# Patient Record
Sex: FEMALE | Race: WHITE
[De-identification: names, ages, dates, MRNs, and addresses within clinical notes are randomized per-mention and may not be internally consistent; named-entity substitution may affect disease eponyms.]

---

## 2021-03-10 ENCOUNTER — HOSPITAL ENCOUNTER (EMERGENCY)
Dept: HOSPITAL 7 - FB.ED | Age: 21
Discharge: HOME | End: 2021-03-10
Payer: COMMERCIAL

## 2021-03-10 DIAGNOSIS — Z88.8: ICD-10-CM

## 2021-03-10 DIAGNOSIS — G43.909: Primary | ICD-10-CM

## 2021-03-10 PROCEDURE — 96374 THER/PROPH/DIAG INJ IV PUSH: CPT

## 2021-03-10 PROCEDURE — 85025 COMPLETE CBC W/AUTO DIFF WBC: CPT

## 2021-03-10 PROCEDURE — 36415 COLL VENOUS BLD VENIPUNCTURE: CPT

## 2021-03-10 PROCEDURE — 99284 EMERGENCY DEPT VISIT MOD MDM: CPT

## 2021-03-10 PROCEDURE — 80048 BASIC METABOLIC PNL TOTAL CA: CPT

## 2021-03-10 PROCEDURE — 84702 CHORIONIC GONADOTROPIN TEST: CPT

## 2021-03-10 PROCEDURE — 96375 TX/PRO/DX INJ NEW DRUG ADDON: CPT

## 2021-03-10 PROCEDURE — 96372 THER/PROPH/DIAG INJ SC/IM: CPT

## 2021-03-10 NOTE — EDM.PDOC
ED HPI GENERAL MEDICAL PROBLEM





- General


Chief Complaint: Neuro Symptoms/Deficits


Stated Complaint: HEAD HURTS,VOMITING


Time Seen by Provider: 03/10/21 12:35


Source of Information: Reports: Patient, Family


History Limitations: Reports: No Limitations





- History of Present Illness


INITIAL COMMENTS - FREE TEXT/NARRATIVE: 





Patient presented to the ED because of headache,nausea, and vomiting. The 

headache is over the frontal area,throbbing,8/10, with associated photophobia. 

Her headache got worse when she used the computer for her midterms.





- Related Data


                                    Allergies











Allergy/AdvReac Type Severity Reaction Status Date / Time


 


.allergy med Allergy  Cannot Uncoded 03/10/21 12:33





   Remember  











Home Meds: 


                                    Home Meds





Ondansetron [Zofran ODT] 4 mg PO Q4H PRN #5 tab.dis 03/10/21 [Rx]











Social & Family History





- Tobacco Use


Tobacco Use Status *Q: Never Tobacco User





- Caffeine Use


Caffeine Use: Reports: None





- Recreational Drug Use


Recreational Drug Use: No





ED ROS GENERAL





- Review of Systems


Review Of Systems: See Below


Constitutional: Reports: No Symptoms


HEENT: Reports: No Symptoms


Respiratory: Reports: No Symptoms


Cardiovascular: Reports: No Symptoms


Endocrine: Reports: No Symptoms


GI/Abdominal: Reports: Nausea, Vomiting


Musculoskeletal: Reports: No Symptoms


Skin: Reports: No Symptoms


Neurological: Reports: Headache


Psychiatric: Reports: No Symptoms





ED EXAM, GENERAL





- Physical Exam


Exam: See Below


Exam Limited By: No Limitations


General Appearance: Alert, No Apparent Distress


Ears: Normal External Exam, Normal Canal


Nose: Normal Inspection, Normal Mucosa, No Blood


Throat/Mouth: Normal Inspection, Normal Lips


Head: Atraumatic, Normocephalic


Neck: Normal Inspection, Supple, Non-Tender, Full Range of Motion


Respiratory/Chest: No Respiratory Distress, Lungs Clear, Normal Breath Sounds, 

No Accessory Muscle Use, Chest Non-Tender


Cardiovascular: Normal Peripheral Pulses, Regular Rate, Rhythm, No Edema, No 

Gallop, No JVD, No Murmur, No Rub


GI/Abdominal: Normal Bowel Sounds, Soft, Non-Tender, No Organomegaly, No 

Distention, No Abnormal Bruit, No Mass


Back Exam: Normal Inspection, Full Range of Motion


Extremities: Normal Inspection, Normal Range of Motion, Non-Tender, No Pedal 

Edema, Normal Capillary Refill


Neurological: Alert, Oriented, CN II-XII Intact, Normal Cognition, Normal Gait, 

Normal Reflexes, No Motor/Sensory Deficits


Psychiatric: Normal Affect


Skin Exam: Warm





Course





- Vital Signs


Text/Narrative:: 





Lab result was discussed with patient


NS 1 L bolus


Zofran 4 mg IV x1


Toradol 30 mg IV x1


Last Recorded V/S: 





                                Last Vital Signs











Temp  36.6 C   03/10/21 12:27


 


Pulse  79   03/10/21 12:27


 


Resp  16   03/10/21 12:27


 


BP  119/76   03/10/21 12:27


 


Pulse Ox  98   03/10/21 12:27














- Orders/Labs/Meds


Orders: 





                               Active Orders 24 hr











 Category Date Time Status


 


 BASIC METABOLIC PANEL,BMP [CHEM] Stat Lab  03/10/21 12:43 Ordered


 


 CBC WITH AUTO DIFF [HEME] Stat Lab  03/10/21 12:43 Ordered


 


 Sodium Chloride 0.9% [Normal Saline] 1,000 ml Med  03/10/21 13:00 Active





 IV ASDIRECTED   








                                Medication Orders





Sodium Chloride (Normal Saline)  1,000 mls @ 999 mls/hr IV ASDIRECTED SHANTELL


   Last Admin: 03/10/21 13:00  Dose: 999 mls/hr


   Documented by: MELIA








Meds: 





Medications











Generic Name Dose Route Start Last Admin





  Trade Name Freq  PRN Reason Stop Dose Admin


 


Sodium Chloride  1,000 mls @ 999 mls/hr  03/10/21 13:00  03/10/21 13:00





  Normal Saline  IV   999 mls/hr





  ASDIRECTED SHANTELL   Administration














Discontinued Medications














Generic Name Dose Route Start Last Admin





  Trade Name Freq  PRN Reason Stop Dose Admin


 


Ketorolac Tromethamine  30 mg  03/10/21 12:41  03/10/21 12:46





  Ketorolac 30 Mg/Ml Sdv  IVPUSH  03/10/21 12:42  30 mg





  NOW STA   Administration


 


Ondansetron HCl  4 mg  03/10/21 12:41  03/10/21 12:46





  Ondansetron 4 Mg/2 Ml Sdv  IVPUSH  03/10/21 12:42  4 mg





  NOW STA   Administration














Departure





- Departure


Time of Disposition: 13:45


Disposition: Home, Self-Care 01


Condition: Good


Clinical Impression: 


 Migraine








- Discharge Information


Prescriptions: 


Ondansetron [Zofran ODT] 4 mg PO Q4H PRN #5 tab.dis


 PRN Reason: Nausea


Instructions:  Migraine Headache


Additional Instructions: 


Please read discharge instructions on Migraine


Take zofran ODT 4 mg every 4 hours as needed for nausea


Ibuprofen 800 mg with tylenol 1000 mg every 8 hours as needed for headache


Follow up as needed





Sepsis Event Note (ED)





- Evaluation


Sepsis Screening Result: No Definite Risk





- Focused Exam


Vital Signs: 





                                   Vital Signs











  Temp Pulse Resp BP Pulse Ox


 


 03/10/21 12:27  36.6 C  79  16  119/76  98














- My Orders


Last 24 Hours: 





My Active Orders





03/10/21 12:43


BASIC METABOLIC PANEL,BMP [CHEM] Stat 


CBC WITH AUTO DIFF [HEME] Stat 





03/10/21 13:00


Sodium Chloride 0.9% [Normal Saline] 1,000 ml IV ASDIRECTED 














- Assessment/Plan


Last 24 Hours: 





My Active Orders





03/10/21 12:43


BASIC METABOLIC PANEL,BMP [CHEM] Stat 


CBC WITH AUTO DIFF [HEME] Stat 





03/10/21 13:00


Sodium Chloride 0.9% [Normal Saline] 1,000 ml IV ASDIRECTED